# Patient Record
Sex: FEMALE | Race: WHITE | NOT HISPANIC OR LATINO
[De-identification: names, ages, dates, MRNs, and addresses within clinical notes are randomized per-mention and may not be internally consistent; named-entity substitution may affect disease eponyms.]

---

## 2017-06-15 ENCOUNTER — APPOINTMENT (OUTPATIENT)
Dept: INTERNAL MEDICINE | Facility: CLINIC | Age: 32
End: 2017-06-15

## 2017-06-19 ENCOUNTER — APPOINTMENT (OUTPATIENT)
Dept: INTERNAL MEDICINE | Facility: CLINIC | Age: 32
End: 2017-06-19

## 2017-07-29 ENCOUNTER — APPOINTMENT (OUTPATIENT)
Dept: INTERNAL MEDICINE | Facility: CLINIC | Age: 32
End: 2017-07-29

## 2017-08-24 ENCOUNTER — APPOINTMENT (OUTPATIENT)
Dept: PEDIATRIC CARDIOLOGY | Facility: CLINIC | Age: 32
End: 2017-08-24

## 2017-09-12 ENCOUNTER — APPOINTMENT (OUTPATIENT)
Dept: PEDIATRIC CARDIOLOGY | Facility: CLINIC | Age: 32
End: 2017-09-12
Payer: COMMERCIAL

## 2017-09-12 PROCEDURE — 99203 OFFICE O/P NEW LOW 30 MIN: CPT | Mod: 25

## 2017-09-12 PROCEDURE — 93325 DOPPLER ECHO COLOR FLOW MAPG: CPT

## 2017-09-12 PROCEDURE — 76825 ECHO EXAM OF FETAL HEART: CPT

## 2017-09-12 PROCEDURE — 76827 ECHO EXAM OF FETAL HEART: CPT

## 2017-10-14 ENCOUNTER — APPOINTMENT (OUTPATIENT)
Dept: INTERNAL MEDICINE | Facility: CLINIC | Age: 32
End: 2017-10-14

## 2018-01-24 ENCOUNTER — APPOINTMENT (OUTPATIENT)
Dept: INTERNAL MEDICINE | Facility: CLINIC | Age: 33
End: 2018-01-24
Payer: COMMERCIAL

## 2018-01-24 VITALS
DIASTOLIC BLOOD PRESSURE: 78 MMHG | BODY MASS INDEX: 31.41 KG/M2 | RESPIRATION RATE: 14 BRPM | WEIGHT: 184 LBS | HEIGHT: 64 IN | OXYGEN SATURATION: 98 % | SYSTOLIC BLOOD PRESSURE: 112 MMHG | TEMPERATURE: 98.9 F | HEART RATE: 126 BPM

## 2018-01-24 DIAGNOSIS — Z87.09 PERSONAL HISTORY OF OTHER DISEASES OF THE RESPIRATORY SYSTEM: ICD-10-CM

## 2018-01-24 LAB
FLUAV SPEC QL CULT: NEGATIVE
FLUBV AG SPEC QL IA: NEGATIVE

## 2018-01-24 PROCEDURE — 87804 INFLUENZA ASSAY W/OPTIC: CPT | Mod: QW

## 2018-01-24 PROCEDURE — 99214 OFFICE O/P EST MOD 30 MIN: CPT | Mod: 25

## 2018-01-24 RX ORDER — VALACYCLOVIR 500 MG/1
500 TABLET, FILM COATED ORAL
Qty: 84 | Refills: 0 | Status: DISCONTINUED | COMMUNITY
Start: 2017-12-06

## 2018-01-24 RX ORDER — IBUPROFEN 600 MG/1
600 TABLET, FILM COATED ORAL
Qty: 30 | Refills: 0 | Status: ACTIVE | COMMUNITY
Start: 2018-01-21

## 2018-01-24 RX ORDER — NITROFURANTOIN 25 MG/5ML
25 SUSPENSION ORAL
Qty: 200 | Refills: 0 | Status: DISCONTINUED | COMMUNITY
Start: 2017-10-21

## 2018-01-25 ENCOUNTER — APPOINTMENT (OUTPATIENT)
Dept: INTERNAL MEDICINE | Facility: CLINIC | Age: 33
End: 2018-01-25

## 2018-06-04 ENCOUNTER — APPOINTMENT (OUTPATIENT)
Dept: INTERNAL MEDICINE | Facility: CLINIC | Age: 33
End: 2018-06-04
Payer: COMMERCIAL

## 2018-06-04 VITALS
HEART RATE: 64 BPM | HEIGHT: 64 IN | DIASTOLIC BLOOD PRESSURE: 82 MMHG | WEIGHT: 165 LBS | BODY MASS INDEX: 28.17 KG/M2 | TEMPERATURE: 98.2 F | OXYGEN SATURATION: 100 % | SYSTOLIC BLOOD PRESSURE: 118 MMHG

## 2018-06-04 PROCEDURE — 99213 OFFICE O/P EST LOW 20 MIN: CPT

## 2018-06-04 PROCEDURE — 81025 URINE PREGNANCY TEST: CPT

## 2018-06-04 NOTE — REVIEW OF SYSTEMS
[Fever] : no fever [Chills] : no chills [Earache] : no earache [Nasal Discharge] : no nasal discharge [Sore Throat] : no sore throat [Chest Pain] : no chest pain [Palpitations] : no palpitations [Shortness Of Breath] : no shortness of breath [Wheezing] : no wheezing [Cough] : no cough [Dyspnea on Exertion] : no dyspnea on exertion [Abdominal Pain] : no abdominal pain [Nausea] : no nausea [Constipation] : no constipation [Diarrhea] : diarrhea [Vomiting] : no vomiting [Dysuria] : no dysuria

## 2018-06-04 NOTE — HISTORY OF PRESENT ILLNESS
[FreeTextEntry8] : 32F presents for anxiety and panic attacks after giving birth 4 1/2 months ago. Reports that she is anxious and has panic attacks when she thinks about taking care of her baby including breast feeding. Denies depression or SI. It is affecting her ability to return to work.

## 2018-06-04 NOTE — PHYSICAL EXAM
[No Acute Distress] : no acute distress [Normal Sclera/Conjunctiva] : normal sclera/conjunctiva [PERRL] : pupils equal round and reactive to light [EOMI] : extraocular movements intact [Supple] : supple [No Lymphadenopathy] : no lymphadenopathy [Clear to Auscultation] : lungs were clear to auscultation bilaterally [No Accessory Muscle Use] : no accessory muscle use [Normal Rate] : normal rate  [Regular Rhythm] : with a regular rhythm [Normal S1, S2] : normal S1 and S2 [No Murmur] : no murmur heard

## 2018-06-04 NOTE — ASSESSMENT
[FreeTextEntry1] : Anxiety: Having panic attacks that affect her ability to work. Will not take medication because she is breast feeding and it will make her anxiety worse. Follow-up 2 weeks and will discuss medications at that time if persistent.

## 2018-06-08 LAB
HCG UR QL: NEGATIVE
QUALITY CONTROL: YES

## 2018-08-20 ENCOUNTER — APPOINTMENT (OUTPATIENT)
Dept: INTERNAL MEDICINE | Facility: CLINIC | Age: 33
End: 2018-08-20

## 2020-06-08 DIAGNOSIS — Z11.59 ENCOUNTER FOR SCREENING FOR OTHER VIRAL DISEASES: ICD-10-CM

## 2020-09-14 DIAGNOSIS — Z80.3 FAMILY HISTORY OF MALIGNANT NEOPLASM OF BREAST: ICD-10-CM

## 2020-10-14 ENCOUNTER — APPOINTMENT (OUTPATIENT)
Dept: INTERNAL MEDICINE | Facility: CLINIC | Age: 35
End: 2020-10-14

## 2020-12-16 PROBLEM — Z87.09 HISTORY OF INFLUENZA: Status: RESOLVED | Noted: 2018-01-24 | Resolved: 2020-12-16

## 2021-03-11 ENCOUNTER — APPOINTMENT (OUTPATIENT)
Dept: INTERNAL MEDICINE | Facility: CLINIC | Age: 36
End: 2021-03-11
Payer: COMMERCIAL

## 2021-03-11 VITALS
HEIGHT: 64 IN | OXYGEN SATURATION: 99 % | RESPIRATION RATE: 14 BRPM | BODY MASS INDEX: 29.37 KG/M2 | SYSTOLIC BLOOD PRESSURE: 120 MMHG | HEART RATE: 78 BPM | DIASTOLIC BLOOD PRESSURE: 80 MMHG | TEMPERATURE: 98 F | WEIGHT: 172 LBS

## 2021-03-11 DIAGNOSIS — Z87.42 PERSONAL HISTORY OF OTHER DISEASES OF THE FEMALE GENITAL TRACT: ICD-10-CM

## 2021-03-11 DIAGNOSIS — Z87.74 PERSONAL HISTORY OF (CORRECTED) CONGENITAL MALFORMATIONS OF HEART AND CIRCULATORY SYSTEM: ICD-10-CM

## 2021-03-11 DIAGNOSIS — Z63.4 DISAPPEARANCE AND DEATH OF FAMILY MEMBER: ICD-10-CM

## 2021-03-11 DIAGNOSIS — R79.9 ABNORMAL FINDING OF BLOOD CHEMISTRY, UNSPECIFIED: ICD-10-CM

## 2021-03-11 DIAGNOSIS — Z86.39 PERSONAL HISTORY OF OTHER ENDOCRINE, NUTRITIONAL AND METABOLIC DISEASE: ICD-10-CM

## 2021-03-11 DIAGNOSIS — Z87.898 PERSONAL HISTORY OF OTHER SPECIFIED CONDITIONS: ICD-10-CM

## 2021-03-11 PROCEDURE — 99072 ADDL SUPL MATRL&STAF TM PHE: CPT

## 2021-03-11 PROCEDURE — 99395 PREV VISIT EST AGE 18-39: CPT

## 2021-03-11 SDOH — SOCIAL STABILITY - SOCIAL INSECURITY: DISSAPEARANCE AND DEATH OF FAMILY MEMBER: Z63.4

## 2021-03-11 NOTE — PHYSICAL EXAM
[No Acute Distress] : no acute distress [Well Nourished] : well nourished [Well Developed] : well developed [Well-Appearing] : well-appearing [Normal Sclera/Conjunctiva] : normal sclera/conjunctiva [PERRL] : pupils equal round and reactive to light [EOMI] : extraocular movements intact [Normal Outer Ear/Nose] : the outer ears and nose were normal in appearance [No Lymphadenopathy] : no lymphadenopathy [Supple] : supple [Thyroid Normal, No Nodules] : the thyroid was normal and there were no nodules present [No Respiratory Distress] : no respiratory distress  [No Accessory Muscle Use] : no accessory muscle use [Clear to Auscultation] : lungs were clear to auscultation bilaterally [Normal Rate] : normal rate  [Regular Rhythm] : with a regular rhythm [Normal S1, S2] : normal S1 and S2 [No Murmur] : no murmur heard [No Edema] : there was no peripheral edema [Soft] : abdomen soft [Non Tender] : non-tender [Non-distended] : non-distended [Normal Bowel Sounds] : normal bowel sounds [Normal Posterior Cervical Nodes] : no posterior cervical lymphadenopathy [Normal Anterior Cervical Nodes] : no anterior cervical lymphadenopathy [No CVA Tenderness] : no CVA  tenderness [No Spinal Tenderness] : no spinal tenderness [No Joint Swelling] : no joint swelling [Grossly Normal Strength/Tone] : grossly normal strength/tone [No Rash] : no rash [Coordination Grossly Intact] : coordination grossly intact [No Focal Deficits] : no focal deficits [Normal Gait] : normal gait [Deep Tendon Reflexes (DTR)] : deep tendon reflexes were 2+ and symmetric [Normal Affect] : the affect was normal [Normal Insight/Judgement] : insight and judgment were intact

## 2021-03-11 NOTE — HEALTH RISK ASSESSMENT
[Good] : ~his/her~  mood as  good [Yes] : Yes [4 or more  times a week (4 pts)] : 4 or more  times a week (4 points) [1 or 2 (0 pts)] : 1 or 2 (0 points) [Never (0 pts)] : Never (0 points) [0] : 2) Feeling down, depressed, or hopeless: Not at all (0) [Fully functional (bathing, dressing, toileting, transferring, walking, feeding)] : Fully functional (bathing, dressing, toileting, transferring, walking, feeding) [Fully functional (using the telephone, shopping, preparing meals, housekeeping, doing laundry, using] : Fully functional and needs no help or supervision to perform IADLs (using the telephone, shopping, preparing meals, housekeeping, doing laundry, using transportation, managing medications and managing finances) [Patient reported PAP Smear was normal] : Patient reported PAP Smear was normal [] : No [[id number]] : 0 [Change in mental status noted] : No change in mental status noted [Reports changes in hearing] : Reports no changes in hearing [Reports changes in vision] : Reports no changes in vision [PapSmearDate] : 07/19

## 2021-03-15 LAB
25(OH)D3 SERPL-MCNC: 21.7 NG/ML
ALBUMIN SERPL ELPH-MCNC: 4.7 G/DL
ALP BLD-CCNC: 88 U/L
ALT SERPL-CCNC: 27 U/L
ANION GAP SERPL CALC-SCNC: 9 MMOL/L
AST SERPL-CCNC: 20 U/L
BASOPHILS # BLD AUTO: 0.03 K/UL
BASOPHILS NFR BLD AUTO: 0.5 %
BILIRUB SERPL-MCNC: 0.4 MG/DL
BUN SERPL-MCNC: 13 MG/DL
CALCIUM SERPL-MCNC: 9.3 MG/DL
CHLORIDE SERPL-SCNC: 103 MMOL/L
CHOLEST SERPL-MCNC: 178 MG/DL
CO2 SERPL-SCNC: 28 MMOL/L
CREAT SERPL-MCNC: 0.68 MG/DL
EOSINOPHIL # BLD AUTO: 0.1 K/UL
EOSINOPHIL NFR BLD AUTO: 1.6 %
ESTIMATED AVERAGE GLUCOSE: 91 MG/DL
GLUCOSE SERPL-MCNC: 93 MG/DL
HBA1C MFR BLD HPLC: 4.8 %
HCT VFR BLD CALC: 44 %
HDLC SERPL-MCNC: 57 MG/DL
HGB BLD-MCNC: 14.8 G/DL
IMM GRANULOCYTES NFR BLD AUTO: 0.2 %
LDLC SERPL CALC-MCNC: 107 MG/DL
LYMPHOCYTES # BLD AUTO: 1.93 K/UL
LYMPHOCYTES NFR BLD AUTO: 31.4 %
MAN DIFF?: NORMAL
MCHC RBC-ENTMCNC: 30.1 PG
MCHC RBC-ENTMCNC: 33.6 GM/DL
MCV RBC AUTO: 89.4 FL
MONOCYTES # BLD AUTO: 0.36 K/UL
MONOCYTES NFR BLD AUTO: 5.9 %
NEUTROPHILS # BLD AUTO: 3.72 K/UL
NEUTROPHILS NFR BLD AUTO: 60.4 %
NONHDLC SERPL-MCNC: 120 MG/DL
PLATELET # BLD AUTO: 234 K/UL
POTASSIUM SERPL-SCNC: 4.5 MMOL/L
PROT SERPL-MCNC: 7.4 G/DL
RBC # BLD: 4.92 M/UL
RBC # FLD: 12.6 %
SODIUM SERPL-SCNC: 140 MMOL/L
TRIGL SERPL-MCNC: 65 MG/DL
TSH SERPL-ACNC: 1.88 UIU/ML
WBC # FLD AUTO: 6.15 K/UL

## 2022-01-03 ENCOUNTER — APPOINTMENT (OUTPATIENT)
Dept: INTERNAL MEDICINE | Facility: CLINIC | Age: 37
End: 2022-01-03
Payer: COMMERCIAL

## 2022-01-03 DIAGNOSIS — U07.1 COVID-19: ICD-10-CM

## 2022-01-03 PROCEDURE — 99212 OFFICE O/P EST SF 10 MIN: CPT | Mod: 95

## 2022-01-03 NOTE — HISTORY OF PRESENT ILLNESS
[Home] : at home, [unfilled] , at the time of the visit. [Medical Office: ([facility])___] : at the medical office located in  [Verbal consent obtained from patient] : the patient, [unfilled] [FreeTextEntry8] : 36F presents with cough after testing positive for COVID 1 week ago. Denies fever, dyspnea.

## 2022-01-03 NOTE — REVIEW OF SYSTEMS
[Cough] : cough [Fever] : no fever [Chills] : no chills [Night Sweats] : no night sweats [Chest Pain] : no chest pain [Palpitations] : no palpitations [Lower Ext Edema] : no lower extremity edema [Shortness Of Breath] : no shortness of breath [Wheezing] : no wheezing [Dyspnea on Exertion] : no dyspnea on exertion [Abdominal Pain] : no abdominal pain [Nausea] : no nausea [Vomiting] : no vomiting [Dysuria] : no dysuria

## 2022-01-03 NOTE — PHYSICAL EXAM
[No Acute Distress] : no acute distress [Normal Sclera/Conjunctiva] : normal sclera/conjunctiva [EOMI] : extraocular movements intact [No Respiratory Distress] : no respiratory distress  [No Accessory Muscle Use] : no accessory muscle use

## 2022-02-28 ENCOUNTER — APPOINTMENT (OUTPATIENT)
Dept: INTERNAL MEDICINE | Facility: CLINIC | Age: 37
End: 2022-02-28
Payer: COMMERCIAL

## 2022-02-28 DIAGNOSIS — B95.8 UNSPECIFIED STAPHYLOCOCCUS AS THE CAUSE OF DISEASES CLASSIFIED ELSEWHERE: ICD-10-CM

## 2022-02-28 PROCEDURE — 99213 OFFICE O/P EST LOW 20 MIN: CPT | Mod: 95

## 2022-02-28 NOTE — PHYSICAL EXAM
[No Acute Distress] : no acute distress [Normal Sclera/Conjunctiva] : normal sclera/conjunctiva [EOMI] : extraocular movements intact [de-identified] : erythematous, swollen nodule on right side of chin

## 2022-02-28 NOTE — HISTORY OF PRESENT ILLNESS
[Home] : at home, [unfilled] , at the time of the visit. [Medical Office: ([facility])___] : at the medical office located in  [Verbal consent obtained from patient] : the patient, [unfilled] [FreeTextEntry8] : 36F presents with 1 day of swollen, erythematous nodule on chin. Denies discharge, chills, fever. Has tried warm compresses which help minimally. \par

## 2022-02-28 NOTE — ASSESSMENT
[FreeTextEntry1] : Staph: No fever. Start doxycycline empirically. Continue warm compresses. Follow-up in office if worsening. \par

## 2022-04-11 PROBLEM — Z11.59 SCREENING FOR VIRAL DISEASE: Status: ACTIVE | Noted: 2020-06-08

## 2022-09-20 ENCOUNTER — APPOINTMENT (OUTPATIENT)
Dept: INTERNAL MEDICINE | Facility: CLINIC | Age: 37
End: 2022-09-20
Payer: COMMERCIAL

## 2022-09-20 ENCOUNTER — NON-APPOINTMENT (OUTPATIENT)
Age: 37
End: 2022-09-20

## 2022-09-20 VITALS
HEIGHT: 64 IN | BODY MASS INDEX: 29.88 KG/M2 | WEIGHT: 175 LBS | DIASTOLIC BLOOD PRESSURE: 72 MMHG | HEART RATE: 64 BPM | SYSTOLIC BLOOD PRESSURE: 104 MMHG

## 2022-09-20 DIAGNOSIS — F41.9 ANXIETY DISORDER, UNSPECIFIED: ICD-10-CM

## 2022-09-20 PROCEDURE — 99213 OFFICE O/P EST LOW 20 MIN: CPT

## 2022-09-20 NOTE — HISTORY OF PRESENT ILLNESS
[Initial Evaluation] : an initial evaluation of [Anxiety] : anxiety [Currently Experiencing] : The patient is currently experiencing symptoms. [Gradual] : gradual [___ Month(s) Ago] : [unfilled] month(s) ago [Frequent] : frequently [Heart Palpitations] : heart palpitations [Chest Pain] : no chest pain [Shortness of Breath] : no shortness of breath

## 2022-09-20 NOTE — ASSESSMENT
[FreeTextEntry1] : Anxiety: Discussed pause from school for two weeks while she has cardiac workup. She will schedule with therapist. Follow-up 2-4 weeks if persistent. Discussed monitoring labs if she tries CBD. \par \par

## 2022-09-20 NOTE — REVIEW OF SYSTEMS
[Palpitations] : palpitations [Anxiety] : anxiety [Chest Pain] : no chest pain [Lower Ext Edema] : no lower extremity edema [Shortness Of Breath] : no shortness of breath [Wheezing] : no wheezing [Dyspnea on Exertion] : no dyspnea on exertion [Abdominal Pain] : no abdominal pain [Nausea] : no nausea [Constipation] : no constipation [Diarrhea] : diarrhea [Vomiting] : no vomiting [Dizziness] : no dizziness

## 2022-10-20 ENCOUNTER — NON-APPOINTMENT (OUTPATIENT)
Age: 37
End: 2022-10-20

## 2022-10-28 ENCOUNTER — APPOINTMENT (OUTPATIENT)
Dept: CARDIOLOGY | Facility: CLINIC | Age: 37
End: 2022-10-28

## 2022-11-23 ENCOUNTER — APPOINTMENT (OUTPATIENT)
Dept: INTERNAL MEDICINE | Facility: CLINIC | Age: 37
End: 2022-11-23
Payer: COMMERCIAL

## 2022-11-23 VITALS
TEMPERATURE: 97.8 F | HEIGHT: 64 IN | SYSTOLIC BLOOD PRESSURE: 108 MMHG | DIASTOLIC BLOOD PRESSURE: 78 MMHG | OXYGEN SATURATION: 98 % | RESPIRATION RATE: 14 BRPM | BODY MASS INDEX: 29.71 KG/M2 | WEIGHT: 174 LBS | HEART RATE: 97 BPM

## 2022-11-23 DIAGNOSIS — J20.9 ACUTE BRONCHITIS, UNSPECIFIED: ICD-10-CM

## 2022-11-23 PROCEDURE — 99214 OFFICE O/P EST MOD 30 MIN: CPT

## 2022-11-23 NOTE — HISTORY OF PRESENT ILLNESS
[Non-Productive Cough] : non-productive cough [Wheezing] : wheezing [Currently Experiencing] : The patient is currently experiencing symptoms. [Gradual] : gradual [___ Week(s) Ago] : [unfilled] week(s) ago [Daily] : occur daily [Moderate] : moderate in severity [Unchanged] : unchanged [Fever] : no fever [de-identified] : h [de-identified] : had flu last week

## 2022-11-23 NOTE — ASSESSMENT
[FreeTextEntry1] : Bronchitis: Wheezes on exam and cough persistent. Discussed promethazine PRN and prednisone course. Follow-up 1 week if not improved. \par

## 2022-11-23 NOTE — PHYSICAL EXAM
[No Acute Distress] : no acute distress [Normal Sclera/Conjunctiva] : normal sclera/conjunctiva [PERRL] : pupils equal round and reactive to light [EOMI] : extraocular movements intact [No Respiratory Distress] : no respiratory distress  [No Accessory Muscle Use] : no accessory muscle use [Normal Rate] : normal rate  [Regular Rhythm] : with a regular rhythm [Normal S1, S2] : normal S1 and S2 [de-identified] : mild expiratory wheeze on left

## 2022-12-07 ENCOUNTER — EMERGENCY (EMERGENCY)
Facility: HOSPITAL | Age: 37
LOS: 0 days | Discharge: HOME | End: 2022-12-07
Attending: EMERGENCY MEDICINE
Payer: COMMERCIAL

## 2022-12-07 ENCOUNTER — APPOINTMENT (OUTPATIENT)
Dept: INTERNAL MEDICINE | Facility: CLINIC | Age: 37
End: 2022-12-07

## 2022-12-07 VITALS
OXYGEN SATURATION: 100 % | TEMPERATURE: 99 F | HEART RATE: 88 BPM | RESPIRATION RATE: 17 BRPM | DIASTOLIC BLOOD PRESSURE: 80 MMHG | SYSTOLIC BLOOD PRESSURE: 117 MMHG

## 2022-12-07 VITALS — HEIGHT: 64 IN | WEIGHT: 171.96 LBS
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
## 2022-12-07 DIAGNOSIS — R00.1 BRADYCARDIA, UNSPECIFIED: ICD-10-CM
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
## 2022-12-07 DIAGNOSIS — T78.40XA ALLERGY, UNSPECIFIED, INITIAL ENCOUNTER: ICD-10-CM

## 2022-12-07 DIAGNOSIS — Z88.1 ALLERGY STATUS TO OTHER ANTIBIOTIC AGENTS STATUS: ICD-10-CM

## 2022-12-07 DIAGNOSIS — R21 RASH AND OTHER NONSPECIFIC SKIN ERUPTION: ICD-10-CM

## 2022-12-07 PROCEDURE — 99283 EMERGENCY DEPT VISIT LOW MDM: CPT

## 2022-12-07 PROCEDURE — 99282 EMERGENCY DEPT VISIT SF MDM: CPT

## 2022-12-07 NOTE — ED ADULT TRIAGE NOTE - CHIEF COMPLAINT QUOTE
Pt presents to the ED c/o right forearm rash and upper lip swelling x1 day. Denies itching. No respiratory distress noted. No medication PTA.

## 2022-12-07 NOTE — ED STATDOCS - OBJECTIVE STATEMENT
38 y/o F w/ hx of bradycardia presents to ED c/o rash in her R forearm since yesterday. Pt called MD this morning for an appt and noticed swelling in her top lip. Was prescribed prednisone and Codeine which she hasn't picked up. Denies difficulty breathing. Pt had a recent back facial and notes her back is normal and didn't have anything spread to her arm. Reports she has been feeling more stress than usual and had concerns for anaphylaxis. Also had the FLU 3 weeks ago where she was taking Robitussin. Hasn't taken any meds PTA. 36 y/o F w/ hx of bradycardia presents to ED c/o rash in her R forearm since yesterday. Pt called MD this morning for an appt and noticed swelling in her top lip. Denies difficulty breathing. Pt had a recent back facial and notes her back is normal and didn't have anything spread to her arm. Reports she has been feeling more stress than usual and had concerns for anaphylaxis. Also had the FLU 3 weeks ago where she was taking Robitussin. Hasn't taken any meds PTA.

## 2022-12-07 NOTE — ED STATDOCS - CLINICAL SUMMARY MEDICAL DECISION MAKING FREE TEXT BOX
discussed with pt may be due to the new skincare and facials she recently had, she has no itching at this time, written for short course of prednisone and instructed to use benadryl prn, given return precautions, f/u and dc in stable condition.

## 2022-12-07 NOTE — ED STATDOCS - PATIENT PORTAL LINK FT
You can access the FollowMyHealth Patient Portal offered by [facility] by registering at the following website: [email or web address]. By joining "[employer]’s FollowMyHealth portal, you will also be able to view your health information using other applications (apps) compatible with our system.

## 2022-12-07 NOTE — ED STATDOCS - PHYSICAL EXAMINATION
Constitutional: NAD AAOx3  Eyes: PERRL, EOMI  Head: Normocephalic atraumatic  Mouth: MMM  Cardiac: regular rate   Resp: Lungs CTAB  GI: Abd s/nt/nd  Neuro: CN2-12 intact  Extremities: Intact distal pulses b/l, no calf tenderness, normal ROM b/l UE and LE   Skin: No rashes Constitutional: NAD AAOx3  Eyes: PERRL, EOMI  Head: Normocephalic atraumatic  Mouth: MMM, lips/tongue and oropharynx appear normal with no swelling  Cardiac: regular rate   Resp: Lungs CTAB  GI: Abd s/nt/nd  Neuro: CN2-12 intact  Extremities: Intact distal pulses b/l, no calf tenderness, normal ROM b/l UE and LE   Skin: pinpoint erythematous spots on right arm/forearm, mildly raised, no petechie/purpura

## 2022-12-07 NOTE — ED STATDOCS - PROGRESS NOTE DETAILS
pt with rash to R forearm will d/c home with pmd f/u, prednisone, cortisone, return precautions   Gisella Villanueva PA-C

## 2022-12-07 NOTE — ED STATDOCS - ATTENDING APP SHARED VISIT CONTRIBUTION OF CARE
I, Ree Borja MD, personally saw the patient with ACP.  I have personally performed a face to face diagnostic evaluation on this patient.  I have reviewed the ACP note and agree with the history, exam, and plan of care, except as noted.  I personally saw the patient and performed a substantive portion of the visit including all aspects of the medical decision making.

## 2022-12-28 ENCOUNTER — APPOINTMENT (OUTPATIENT)
Dept: INTERNAL MEDICINE | Facility: CLINIC | Age: 37
End: 2022-12-28

## 2023-01-30 ENCOUNTER — APPOINTMENT (OUTPATIENT)
Dept: INTERNAL MEDICINE | Facility: CLINIC | Age: 38
End: 2023-01-30
Payer: COMMERCIAL

## 2023-01-30 DIAGNOSIS — B34.9 VIRAL INFECTION, UNSPECIFIED: ICD-10-CM

## 2023-01-30 PROCEDURE — 99213 OFFICE O/P EST LOW 20 MIN: CPT | Mod: 95

## 2023-01-30 RX ORDER — PROMETHAZINE HYDROCHLORIDE 6.25 MG/5ML
6.25 SOLUTION ORAL
Qty: 120 | Refills: 0 | Status: COMPLETED | COMMUNITY
Start: 2022-11-23 | End: 2023-01-30

## 2023-01-30 RX ORDER — PREDNISONE 5 MG/5ML
5 SOLUTION ORAL DAILY
Qty: 1 | Refills: 0 | Status: COMPLETED | COMMUNITY
Start: 2022-11-23 | End: 2023-01-30

## 2023-01-30 NOTE — ASSESSMENT
[FreeTextEntry1] : viral infection: Denies fever, dyspnea. Recommended increase fluids and rest. Follow-up in office if persistent. \par

## 2023-01-30 NOTE — REVIEW OF SYSTEMS
[Diarrhea] : diarrhea [Fever] : no fever [Chills] : no chills [Shortness Of Breath] : no shortness of breath [Wheezing] : no wheezing [Abdominal Pain] : no abdominal pain [Nausea] : no nausea [Constipation] : no constipation [Vomiting] : no vomiting [Dysuria] : no dysuria

## 2023-01-30 NOTE — HISTORY OF PRESENT ILLNESS
[Dizziness] : Dizziness [Syncope] : no syncope [Nausea] : no nausea [Vomiting] : no vomiting [Fever] : no fever [URI Symptoms] : no symptoms of an upper respiratory infection

## 2023-01-31 ENCOUNTER — NON-APPOINTMENT (OUTPATIENT)
Age: 38
End: 2023-01-31

## 2023-03-01 ENCOUNTER — APPOINTMENT (OUTPATIENT)
Dept: INTERNAL MEDICINE | Facility: CLINIC | Age: 38
End: 2023-03-01

## 2023-03-04 ENCOUNTER — APPOINTMENT (OUTPATIENT)
Dept: INTERNAL MEDICINE | Facility: CLINIC | Age: 38
End: 2023-03-04

## 2023-03-09 ENCOUNTER — APPOINTMENT (OUTPATIENT)
Dept: INTERNAL MEDICINE | Facility: CLINIC | Age: 38
End: 2023-03-09
Payer: COMMERCIAL

## 2023-03-09 DIAGNOSIS — K12.0 RECURRENT ORAL APHTHAE: ICD-10-CM

## 2023-03-09 PROCEDURE — 99203 OFFICE O/P NEW LOW 30 MIN: CPT | Mod: 95

## 2023-03-09 PROCEDURE — 99213 OFFICE O/P EST LOW 20 MIN: CPT | Mod: 95

## 2023-03-09 NOTE — HISTORY OF PRESENT ILLNESS
[Home] : at home, [unfilled] , at the time of the visit. [Medical Office: ([facility])___] : at the medical office located in  [Verbal consent obtained from patient] : the patient, [unfilled] [FreeTextEntry8] : JOVITA RODAS is a 37 year old female who was referred and scheduled for a same-day telehealth evaluation. \par PCP is Dr. Jose Roberto Tello.\par She states ~ 1 week ago she had symptoms of an upper respiratory infection (congestion, slight runny nose, right ear slight ache behind the ear. She had seen her dentist who had noted some dental-related issues and also given her c/o ear pain, initiated her on Amoxicillin 500 mg TID--she states she does not tolerate pills/capsules well, prefers liquid, but as liquid was backordered, was ordered capsules which she has been opening and mixing in applesauce. After a day she noted slight sores in inner right cheek and left inner lower lip. She stopped Amoxicillin after 3 days (last took Monday) and yesterday noted cheek sore resolved, still has lip sore.  Prior congestion/right ear discomfort resolved.

## 2023-03-09 NOTE — PLAN
[FreeTextEntry1] : Discussed with patient that her mouth sores are most consistent with aphthous ulcers--either post-viral illness or ?related to taking Amoxicillin (opened up capsules and ingested in applesauce). For now advised to use oral rinse such as Chlorhexidine mouth wash twice a day and to continue holding Amoxicillin until speaks with her dentist. \par To f.u with her PCP if ulcer persists despite above.

## 2023-03-09 NOTE — PHYSICAL EXAM
[No Acute Distress] : no acute distress [No Respiratory Distress] : no respiratory distress  [Normal Affect] : the affect was normal [Normal Insight/Judgement] : insight and judgment were intact [de-identified] : note red irregular patch on left inner lower lip--no apparent bleeding or oozing, no lip swelling. [de-identified] : speaking full sentences without difficulty

## 2023-03-29 ENCOUNTER — APPOINTMENT (OUTPATIENT)
Dept: INTERNAL MEDICINE | Facility: CLINIC | Age: 38
End: 2023-03-29
Payer: COMMERCIAL

## 2023-03-29 VITALS
TEMPERATURE: 98.6 F | RESPIRATION RATE: 14 BRPM | SYSTOLIC BLOOD PRESSURE: 108 MMHG | DIASTOLIC BLOOD PRESSURE: 70 MMHG | OXYGEN SATURATION: 98 % | HEART RATE: 96 BPM

## 2023-03-29 DIAGNOSIS — F41.1 GENERALIZED ANXIETY DISORDER: ICD-10-CM

## 2023-03-29 DIAGNOSIS — Z00.00 ENCOUNTER FOR GENERAL ADULT MEDICAL EXAMINATION W/OUT ABNORMAL FINDINGS: ICD-10-CM

## 2023-03-29 PROCEDURE — G0444 DEPRESSION SCREEN ANNUAL: CPT | Mod: 59

## 2023-03-29 PROCEDURE — 99395 PREV VISIT EST AGE 18-39: CPT | Mod: 25

## 2023-03-29 NOTE — PLAN
[FreeTextEntry1] : HCM: due for pap smear, patient advised to f/u GYN. Declines flu vaccine, Tdap vaccine. Check routine labs. \par \par JADEN: has been ongoing for years. She is interested in therapy. Referral provided. \par \par Vitamin D deficiency: check vitamin D level.

## 2023-03-29 NOTE — HEALTH RISK ASSESSMENT
[Yes] : Yes [Monthly or less (1 pt)] : Monthly or less (1 point) [1 or 2 (0 pts)] : 1 or 2 (0 points) [Never (0 pts)] : Never (0 points) [0] : 2) Feeling down, depressed, or hopeless: Not at all (0) [PHQ-2 Negative - No further assessment needed] : PHQ-2 Negative - No further assessment needed [Patient reported PAP Smear was normal] : Patient reported PAP Smear was normal [With Family] : lives with family [Employed] : employed [] :  [# Of Children ___] : has [unfilled] children [Fully functional (bathing, dressing, toileting, transferring, walking, feeding)] : Fully functional (bathing, dressing, toileting, transferring, walking, feeding) [Fully functional (using the telephone, shopping, preparing meals, housekeeping, doing laundry, using] : Fully functional and needs no help or supervision to perform IADLs (using the telephone, shopping, preparing meals, housekeeping, doing laundry, using transportation, managing medications and managing finances) [Former] : Former [0-4] : 0-4 [< 15 Years] : < 15 Years [[id number]] : 0 [PapSmearDate] : 01/18 [FreeTextEntry2] :

## 2023-03-31 LAB
25(OH)D3 SERPL-MCNC: 23.3 NG/ML
ALBUMIN SERPL ELPH-MCNC: 4.6 G/DL
ALP BLD-CCNC: 90 U/L
ALT SERPL-CCNC: 38 U/L
ANION GAP SERPL CALC-SCNC: 17 MMOL/L
AST SERPL-CCNC: 32 U/L
BASOPHILS # BLD AUTO: 0.04 K/UL
BASOPHILS NFR BLD AUTO: 0.6 %
BILIRUB SERPL-MCNC: 0.5 MG/DL
BUN SERPL-MCNC: 11 MG/DL
CALCIUM SERPL-MCNC: 9.5 MG/DL
CHLORIDE SERPL-SCNC: 102 MMOL/L
CHOLEST SERPL-MCNC: 181 MG/DL
CO2 SERPL-SCNC: 22 MMOL/L
CREAT SERPL-MCNC: 0.64 MG/DL
EGFR: 118 ML/MIN/1.73M2
EOSINOPHIL # BLD AUTO: 0.07 K/UL
EOSINOPHIL NFR BLD AUTO: 1.1 %
ESTIMATED AVERAGE GLUCOSE: 94 MG/DL
FOLATE SERPL-MCNC: 12.5 NG/ML
GLUCOSE SERPL-MCNC: 83 MG/DL
HBA1C MFR BLD HPLC: 4.9 %
HCT VFR BLD CALC: 39.6 %
HDLC SERPL-MCNC: 60 MG/DL
HGB BLD-MCNC: 13.1 G/DL
IMM GRANULOCYTES NFR BLD AUTO: 0.3 %
LDLC SERPL CALC-MCNC: 109 MG/DL
LYMPHOCYTES # BLD AUTO: 2.22 K/UL
LYMPHOCYTES NFR BLD AUTO: 34.3 %
MAN DIFF?: NORMAL
MCHC RBC-ENTMCNC: 29.8 PG
MCHC RBC-ENTMCNC: 33.1 GM/DL
MCV RBC AUTO: 90 FL
MONOCYTES # BLD AUTO: 0.44 K/UL
MONOCYTES NFR BLD AUTO: 6.8 %
NEUTROPHILS # BLD AUTO: 3.69 K/UL
NEUTROPHILS NFR BLD AUTO: 56.9 %
NONHDLC SERPL-MCNC: 120 MG/DL
PLATELET # BLD AUTO: 228 K/UL
POTASSIUM SERPL-SCNC: 3.8 MMOL/L
PROT SERPL-MCNC: 7.3 G/DL
RBC # BLD: 4.4 M/UL
RBC # FLD: 13.3 %
SODIUM SERPL-SCNC: 141 MMOL/L
TRIGL SERPL-MCNC: 57 MG/DL
TSH SERPL-ACNC: 2.17 UIU/ML
VIT B12 SERPL-MCNC: 449 PG/ML
WBC # FLD AUTO: 6.48 K/UL

## 2023-04-03 ENCOUNTER — TRANSCRIPTION ENCOUNTER (OUTPATIENT)
Age: 38
End: 2023-04-03

## 2023-04-28 ENCOUNTER — TRANSCRIPTION ENCOUNTER (OUTPATIENT)
Age: 38
End: 2023-04-28

## 2023-06-23 ENCOUNTER — TRANSCRIPTION ENCOUNTER (OUTPATIENT)
Age: 38
End: 2023-06-23

## 2023-07-14 ENCOUNTER — NON-APPOINTMENT (OUTPATIENT)
Age: 38
End: 2023-07-14

## 2023-08-14 ENCOUNTER — TRANSCRIPTION ENCOUNTER (OUTPATIENT)
Age: 38
End: 2023-08-14

## 2023-08-14 ENCOUNTER — APPOINTMENT (OUTPATIENT)
Dept: INTERNAL MEDICINE | Facility: CLINIC | Age: 38
End: 2023-08-14
Payer: COMMERCIAL

## 2023-08-14 VITALS
HEART RATE: 76 BPM | DIASTOLIC BLOOD PRESSURE: 74 MMHG | BODY MASS INDEX: 30.73 KG/M2 | HEIGHT: 64 IN | TEMPERATURE: 98.4 F | SYSTOLIC BLOOD PRESSURE: 114 MMHG | OXYGEN SATURATION: 98 % | RESPIRATION RATE: 14 BRPM | WEIGHT: 180 LBS

## 2023-08-14 DIAGNOSIS — E55.9 VITAMIN D DEFICIENCY, UNSPECIFIED: ICD-10-CM

## 2023-08-14 DIAGNOSIS — K64.9 UNSPECIFIED HEMORRHOIDS: ICD-10-CM

## 2023-08-14 PROCEDURE — 99213 OFFICE O/P EST LOW 20 MIN: CPT

## 2023-08-14 NOTE — PHYSICAL EXAM
[Normal Sphincter Tone] : normal sphincter tone [Normal] : affect was normal and insight and judgment were intact [FreeTextEntry1] : +non thrombosed hemorrhoids

## 2023-08-14 NOTE — PLAN
[FreeTextEntry1] : Hemorrhoids: do not appear thrombosed on exam. Start hydrocortisone cream to apply to area. advised pt to see colorectal surgery if no improvement. Check CBC   Vitamin D deficiency: pt would like to check vitamin D levels today, Order placed.

## 2023-08-14 NOTE — HISTORY OF PRESENT ILLNESS
[FreeTextEntry8] : 37 year old female who presents today with complaint of possible hemorrhoids.  States she felt passed hard stool yesterday and noticed bright red blood in the toilet bowl as well as when she wiped.  She describes some discomfort in the anal region.  States this has happened to her before.

## 2023-08-15 LAB — 25(OH)D3 SERPL-MCNC: 18.8 NG/ML

## 2023-09-18 ENCOUNTER — NON-APPOINTMENT (OUTPATIENT)
Age: 38
End: 2023-09-18

## 2023-09-19 ENCOUNTER — APPOINTMENT (OUTPATIENT)
Dept: INTERNAL MEDICINE | Facility: CLINIC | Age: 38
End: 2023-09-19

## 2023-10-04 ENCOUNTER — APPOINTMENT (OUTPATIENT)
Dept: ULTRASOUND IMAGING | Facility: CLINIC | Age: 38
End: 2023-10-04
Payer: COMMERCIAL

## 2023-10-04 ENCOUNTER — APPOINTMENT (OUTPATIENT)
Dept: INTERNAL MEDICINE | Facility: CLINIC | Age: 38
End: 2023-10-04
Payer: COMMERCIAL

## 2023-10-04 ENCOUNTER — OUTPATIENT (OUTPATIENT)
Dept: OUTPATIENT SERVICES | Facility: HOSPITAL | Age: 38
LOS: 1 days | End: 2023-10-04
Payer: COMMERCIAL

## 2023-10-04 VITALS
HEIGHT: 72 IN | DIASTOLIC BLOOD PRESSURE: 72 MMHG | BODY MASS INDEX: 39.68 KG/M2 | WEIGHT: 293 LBS | TEMPERATURE: 98.2 F | RESPIRATION RATE: 14 BRPM | SYSTOLIC BLOOD PRESSURE: 120 MMHG | OXYGEN SATURATION: 99 % | HEART RATE: 80 BPM

## 2023-10-04 DIAGNOSIS — R10.10 UPPER ABDOMINAL PAIN, UNSPECIFIED: ICD-10-CM

## 2023-10-04 DIAGNOSIS — D17.71 BENIGN LIPOMATOUS NEOPLASM OF KIDNEY: ICD-10-CM

## 2023-10-04 LAB
BASOPHILS # BLD AUTO: 0.04 K/UL
BASOPHILS NFR BLD AUTO: 0.6 %
EOSINOPHIL # BLD AUTO: 0.12 K/UL
EOSINOPHIL NFR BLD AUTO: 1.7 %
HCT VFR BLD CALC: 39.3 %
HGB BLD-MCNC: 13.6 G/DL
IMM GRANULOCYTES NFR BLD AUTO: 0.3 %
LYMPHOCYTES # BLD AUTO: 1.63 K/UL
LYMPHOCYTES NFR BLD AUTO: 22.5 %
MAN DIFF?: NORMAL
MCHC RBC-ENTMCNC: 30 PG
MCHC RBC-ENTMCNC: 34.6 GM/DL
MCV RBC AUTO: 86.8 FL
MONOCYTES # BLD AUTO: 0.43 K/UL
MONOCYTES NFR BLD AUTO: 5.9 %
NEUTROPHILS # BLD AUTO: 4.99 K/UL
NEUTROPHILS NFR BLD AUTO: 69 %
PLATELET # BLD AUTO: 225 K/UL
RBC # BLD: 4.53 M/UL
RBC # FLD: 13.1 %
WBC # FLD AUTO: 7.23 K/UL

## 2023-10-04 PROCEDURE — 99213 OFFICE O/P EST LOW 20 MIN: CPT

## 2023-10-04 PROCEDURE — 76700 US EXAM ABDOM COMPLETE: CPT

## 2023-10-04 PROCEDURE — 76700 US EXAM ABDOM COMPLETE: CPT | Mod: 26

## 2023-10-04 RX ORDER — HYDROCORTISONE 25 MG/G
2.5 CREAM TOPICAL TWICE DAILY
Qty: 1 | Refills: 0 | Status: DISCONTINUED | COMMUNITY
Start: 2023-08-14 | End: 2023-10-04

## 2023-10-04 RX ORDER — DOXYCYCLINE 100 MG/1
100 TABLET, FILM COATED ORAL
Qty: 14 | Refills: 0 | Status: DISCONTINUED | COMMUNITY
Start: 2022-02-28 | End: 2023-10-04

## 2023-10-05 ENCOUNTER — TRANSCRIPTION ENCOUNTER (OUTPATIENT)
Age: 38
End: 2023-10-05

## 2023-10-05 LAB
ALBUMIN SERPL ELPH-MCNC: 4.6 G/DL
ALP BLD-CCNC: 76 U/L
ALT SERPL-CCNC: 13 U/L
AMYLASE/CREAT SERPL: 36 U/L
ANION GAP SERPL CALC-SCNC: 14 MMOL/L
AST SERPL-CCNC: 16 U/L
BILIRUB SERPL-MCNC: 0.8 MG/DL
BUN SERPL-MCNC: 8 MG/DL
CALCIUM SERPL-MCNC: 9.2 MG/DL
CHLORIDE SERPL-SCNC: 103 MMOL/L
CO2 SERPL-SCNC: 21 MMOL/L
CREAT SERPL-MCNC: 0.73 MG/DL
EGFR: 110 ML/MIN/1.73M2
GLUCOSE SERPL-MCNC: 84 MG/DL
LPL SERPL-CCNC: 30 U/L
POTASSIUM SERPL-SCNC: 3.7 MMOL/L
PROT SERPL-MCNC: 7.3 G/DL
SODIUM SERPL-SCNC: 138 MMOL/L

## 2023-10-06 ENCOUNTER — TRANSCRIPTION ENCOUNTER (OUTPATIENT)
Age: 38
End: 2023-10-06

## 2023-10-09 ENCOUNTER — TRANSCRIPTION ENCOUNTER (OUTPATIENT)
Age: 38
End: 2023-10-09

## 2023-10-10 DIAGNOSIS — D18.03 HEMANGIOMA OF INTRA-ABDOMINAL STRUCTURES: ICD-10-CM

## 2023-10-12 ENCOUNTER — OUTPATIENT (OUTPATIENT)
Dept: OUTPATIENT SERVICES | Facility: HOSPITAL | Age: 38
LOS: 1 days | End: 2023-10-12
Payer: COMMERCIAL

## 2023-10-12 ENCOUNTER — APPOINTMENT (OUTPATIENT)
Dept: MRI IMAGING | Facility: CLINIC | Age: 38
End: 2023-10-12
Payer: COMMERCIAL

## 2023-10-12 DIAGNOSIS — D18.03 HEMANGIOMA OF INTRA-ABDOMINAL STRUCTURES: ICD-10-CM

## 2023-10-12 PROCEDURE — 74183 MRI ABD W/O CNTR FLWD CNTR: CPT

## 2023-10-12 PROCEDURE — 74183 MRI ABD W/O CNTR FLWD CNTR: CPT | Mod: 26

## 2023-10-12 PROCEDURE — A9585: CPT

## 2023-11-08 ENCOUNTER — APPOINTMENT (OUTPATIENT)
Dept: HEPATOLOGY | Facility: CLINIC | Age: 38
End: 2023-11-08

## 2023-11-08 ENCOUNTER — APPOINTMENT (OUTPATIENT)
Dept: INTERNAL MEDICINE | Facility: CLINIC | Age: 38
End: 2023-11-08

## 2024-01-08 ENCOUNTER — APPOINTMENT (OUTPATIENT)
Dept: INTERNAL MEDICINE | Facility: CLINIC | Age: 39
End: 2024-01-08
Payer: COMMERCIAL

## 2024-01-08 VITALS
HEART RATE: 89 BPM | TEMPERATURE: 97.8 F | HEIGHT: 64 IN | WEIGHT: 172.2 LBS | DIASTOLIC BLOOD PRESSURE: 74 MMHG | RESPIRATION RATE: 14 BRPM | OXYGEN SATURATION: 99 % | BODY MASS INDEX: 29.4 KG/M2 | SYSTOLIC BLOOD PRESSURE: 118 MMHG

## 2024-01-08 DIAGNOSIS — J02.9 ACUTE PHARYNGITIS, UNSPECIFIED: ICD-10-CM

## 2024-01-08 DIAGNOSIS — J32.9 CHRONIC SINUSITIS, UNSPECIFIED: ICD-10-CM

## 2024-01-08 LAB — S PYO AG SPEC QL IA: NEGATIVE

## 2024-01-08 PROCEDURE — 87880 STREP A ASSAY W/OPTIC: CPT | Mod: QW

## 2024-01-08 PROCEDURE — 99213 OFFICE O/P EST LOW 20 MIN: CPT | Mod: 25

## 2024-01-08 RX ORDER — AMOXICILLIN AND CLAVULANATE POTASSIUM 600; 42.9 MG/5ML; MG/5ML
600-42.9 FOR SUSPENSION ORAL TWICE DAILY
Qty: 1 | Refills: 0 | Status: ACTIVE | COMMUNITY
Start: 2024-01-08 | End: 1900-01-01

## 2024-03-05 ENCOUNTER — APPOINTMENT (OUTPATIENT)
Dept: PEDIATRIC CARDIOLOGY | Facility: CLINIC | Age: 39
End: 2024-03-05
Payer: COMMERCIAL

## 2024-03-05 DIAGNOSIS — O09.292 SUPERVISION OF PREGNANCY WITH OTHER POOR REPRODUCTIVE OR OBSTETRIC HISTORY, SECOND TRIMESTER: ICD-10-CM

## 2024-03-05 PROCEDURE — 76825 ECHO EXAM OF FETAL HEART: CPT

## 2024-03-05 PROCEDURE — 99204 OFFICE O/P NEW MOD 45 MIN: CPT | Mod: 25

## 2024-03-05 PROCEDURE — 76820 UMBILICAL ARTERY ECHO: CPT

## 2024-03-05 PROCEDURE — 76827 ECHO EXAM OF FETAL HEART: CPT

## 2024-03-05 PROCEDURE — 76821 MIDDLE CEREBRAL ARTERY ECHO: CPT

## 2024-03-05 PROCEDURE — 93325 DOPPLER ECHO COLOR FLOW MAPG: CPT | Mod: 59

## 2024-04-04 ENCOUNTER — APPOINTMENT (OUTPATIENT)
Dept: PEDIATRIC CARDIOLOGY | Facility: CLINIC | Age: 39
End: 2024-04-04
Payer: COMMERCIAL

## 2024-04-04 DIAGNOSIS — O36.8390 MATERNAL CARE FOR ABNORMALITIES OF THE FETAL HEART RATE OR RHYTHM, UNSPECIFIED TRIMESTER, NOT APPLICABLE OR UNSPECIFIED: ICD-10-CM

## 2024-04-04 DIAGNOSIS — O35.9XX0 MATERNAL CARE FOR (SUSPECTED) FETAL ABNORMALITY AND DAMAGE, UNSPECIFIED, NOT APPLICABLE OR UNSPECIFIED: ICD-10-CM

## 2024-04-04 DIAGNOSIS — O35.2XX0 MATERNAL CARE FOR (SUSPECTED) HEREDITARY DISEASE IN FETUS, NOT APPLICABLE OR UNSPECIFIED: ICD-10-CM

## 2024-04-04 PROCEDURE — 99215 OFFICE O/P EST HI 40 MIN: CPT

## 2024-04-04 PROCEDURE — 93325 DOPPLER ECHO COLOR FLOW MAPG: CPT | Mod: 59

## 2024-04-04 PROCEDURE — 76821 MIDDLE CEREBRAL ARTERY ECHO: CPT

## 2024-04-04 PROCEDURE — 76826 ECHO EXAM OF FETAL HEART: CPT

## 2024-04-04 PROCEDURE — 76820 UMBILICAL ARTERY ECHO: CPT

## 2024-04-04 PROCEDURE — 76828 ECHO EXAM OF FETAL HEART: CPT

## 2024-04-09 PROBLEM — O35.2XX0 HEREDITARY DISEASE IN FAMILY POSSIBLY AFFECTING FETUS, AFFECTING MANAGEMENT OF MOTHER IN PREGNANCY: Status: ACTIVE | Noted: 2017-09-12

## 2024-04-09 PROBLEM — O35.9XX0 KNOWN OR SUSPECTED FETAL ANOMALY AFFECTING ANTEPARTUM CARE OF MOTHER: Status: ACTIVE | Noted: 2024-03-05

## 2024-04-09 PROBLEM — O36.8390 FETAL ARRHYTHMIA AFFECTING PREGNANCY, ANTEPARTUM: Status: ACTIVE | Noted: 2024-03-05

## 2024-11-06 ENCOUNTER — NON-APPOINTMENT (OUTPATIENT)
Age: 39
End: 2024-11-06

## 2025-01-02 ENCOUNTER — APPOINTMENT (OUTPATIENT)
Dept: INTERNAL MEDICINE | Facility: CLINIC | Age: 40
End: 2025-01-02